# Patient Record
Sex: FEMALE | Race: AMERICAN INDIAN OR ALASKA NATIVE
[De-identification: names, ages, dates, MRNs, and addresses within clinical notes are randomized per-mention and may not be internally consistent; named-entity substitution may affect disease eponyms.]

---
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
## 2020-12-17 ENCOUNTER — HOSPITAL ENCOUNTER (INPATIENT)
Dept: HOSPITAL 46 - FBC | Age: 20
LOS: 2 days | Discharge: HOME | End: 2020-12-19
Attending: OBSTETRICS & GYNECOLOGY | Admitting: OBSTETRICS & GYNECOLOGY
Payer: COMMERCIAL

## 2020-12-17 VITALS — WEIGHT: 203.05 LBS | BODY MASS INDEX: 36.43 KG/M2 | HEIGHT: 62.52 IN

## 2020-12-17 DIAGNOSIS — Z79.899: ICD-10-CM

## 2020-12-17 DIAGNOSIS — D68.9: ICD-10-CM

## 2020-12-17 DIAGNOSIS — Z3A.39: ICD-10-CM

## 2020-12-17 DIAGNOSIS — Z88.1: ICD-10-CM

## 2020-12-17 DIAGNOSIS — O26.03: ICD-10-CM

## 2020-12-17 DIAGNOSIS — D64.9: ICD-10-CM

## 2020-12-17 DIAGNOSIS — J45.909: ICD-10-CM

## 2020-12-17 PROCEDURE — A9270 NON-COVERED ITEM OR SERVICE: HCPCS

## 2020-12-17 PROCEDURE — 10H07YZ INSERTION OF OTHER DEVICE INTO PRODUCTS OF CONCEPTION, VIA NATURAL OR ARTIFICIAL OPENING: ICD-10-PCS | Performed by: OBSTETRICS & GYNECOLOGY

## 2020-12-17 PROCEDURE — 00HU33Z INSERTION OF INFUSION DEVICE INTO SPINAL CANAL, PERCUTANEOUS APPROACH: ICD-10-PCS

## 2020-12-17 PROCEDURE — 3E0R3BZ INTRODUCTION OF ANESTHETIC AGENT INTO SPINAL CANAL, PERCUTANEOUS APPROACH: ICD-10-PCS

## 2020-12-17 PROCEDURE — 10907ZC DRAINAGE OF AMNIOTIC FLUID, THERAPEUTIC FROM PRODUCTS OF CONCEPTION, VIA NATURAL OR ARTIFICIAL OPENING: ICD-10-PCS | Performed by: OBSTETRICS & GYNECOLOGY

## 2020-12-17 NOTE — PR
[facility]
                                    [address]
                                  [place], [place]  [address]
_________________________________________________________________________________________
                                                                 Signed   
 
 
===================================
Progress Notes IP
===================================
Datetime Report Generated by CPN: 2020 23:42
   
PROGRESS NOTES:  [id number]
Impression:  Normal Progression of Labor; Reassuring Fetal Heart Rate
Procedures:  Sterile Vag Exam
Plan:  Continue Present Management; Anticipate Vaginal Delivery
Informed Consent Obtain:  Vaginal Delivery
VITAL SIGNS:  [id number]
Vital Signs:  Reviewed; Within Normal Limits
EXAM:  [id number]
Dilatation:  10.0
Effacement:  100
Station:  2
Contractions:  Rare
MEMBRANES:  [id number]
Comments:  Pt seen and examined. Doing well. Feeling some increased pelvic pressure. On
   exam, now 10cm +2 station. Will start pushing and anticipate  soon.
FETUS A:  [id number]
FHR Baseline:  140
Variability:  Moderate 6-25bpm
Accelerations:  15X15
Decelerations:  None
FHR Category:  Category I
Presentation:  Vertex
Comments on Fetus A:  No evidence of fetal metabolic acidosis
FETUS B:  [id number]
Signing Physician:  Marlys St DO
 
 
Copies:                                
~
 
 
 
 
 
 
 
 
 
*Electronically Signed*  20  [address]  [address] DO               
                                                                       
_________________________________________________________________________________________
PATIENT NAME:     LANDEN MCCANN               
MEDICAL RECORD #: [id number]                     PROGRESS NOTE                 
          ACCT #: [id number]  
DATE OF BIRTH:   [birth year]                                       
PHYSICIAN:   MARLYS ST DO                      RPT #: [id number]
REPORT IS CONFIDENTIAL AND NOT TO BE RELEASED WITHOUT AUTHORIZATION

## 2020-12-17 NOTE — PR
[facility]
                                    [address]
                                  [place], [place]  [address]
_________________________________________________________________________________________
                                                                 Signed   
 
 
===================================
Progress Notes IP
===================================
Datetime Report Generated by CPN: 12/17/2020 14:43
   
PROGRESS NOTES:  [id number]
Impression:  Normal Progression of Labor; Reassuring Fetal Heart Rate
Procedures:  Intrauterine Pressure Catheter; Sterile Vag Exam
Plan:  Continue Present Management
Informed Consent Obtain:  Vaginal Delivery
VITAL SIGNS:  [id number]
Vital Signs:  Reviewed; Within Normal Limits
EXAM:  [id number]
Dilatation:  3.0
Effacement:  80
Station:  -2
Contractions:  Rare
MEMBRANES:  [id number]
Comments:  Pt seen and examined. Doing well. Contractions more uncomfortable. Pitocin
   started per protocol. Not tracing CTXs well, so discussed IUPC and IUPC placed without
   difficulty.
FETUS A:  [id number]
FHR Baseline:  140
Variability:  Moderate 6-25bpm
Accelerations:  15X15
Decelerations:  None
FHR Category:  Category I
Presentation:  Vertex
Comments on Fetus A:  No evidence of fetal metabolic acidosis
FETUS B:  [id number]
Signing Physician:  Marlys St DO
 
 
Copies:                                
~
 
 
 
 
 
 
 
 
*Electronically Signed*  12/17/20  [address]  [address] DO               
                                                                       
_________________________________________________________________________________________
PATIENT NAME:     LANDEN MCCANN               
MEDICAL RECORD #: [id number]                     PROGRESS NOTE                 
          ACCT #: [id number]  
DATE OF BIRTH:   [birth year]                                       
PHYSICIAN:   MARLYS ST DO                      RPT #: [id number]
REPORT IS CONFIDENTIAL AND NOT TO BE RELEASED WITHOUT AUTHORIZATION

## 2020-12-17 NOTE — PR
[facility]
                                    [address]
                                  [place], [place]  [address]
_________________________________________________________________________________________
                                                                 Signed   
 
 
===================================
Progress Notes IP
===================================
Datetime Report Generated by CPN: 12/17/2020 17:14
   
PROGRESS NOTES:  [id number]
Impression:  Normal Progression of Labor; Reassuring Fetal Heart Rate
Procedures:  Intrauterine Pressure Catheter; Sterile Vag Exam
Plan:  Continue Present Management
Informed Consent Obtain:  Vaginal Delivery
VITAL SIGNS:  [id number]
Vital Signs:  Reviewed; Within Normal Limits
EXAM:  [id number]
Dilatation:  4.0
Effacement:  80
Station:  -2
Contractions:  Rare
MEMBRANES:  [id number]
Comments:  Pt seen and examined. Doing well. Comfortable w/ epidural. CTXs adequate.
   Reviewed anticipated course of labor and delivery. All questions answered. Reviewed
   EFW 7#4oz.
FETUS A:  [id number]
FHR Baseline:  140
Variability:  Moderate 6-25bpm
Accelerations:  15X15
Decelerations:  None
FHR Category:  Category I
Presentation:  Vertex
Comments on Fetus A:  No evidence of fetal metabolic acidosis
FETUS B:  [id number]
Signing Physician:  Marlys St DO
 
 
Copies:                                
~
 
 
 
 
 
 
 
 
*Electronically Signed*  12/17/20  [address]  [address] DO               
                                                                       
_________________________________________________________________________________________
PATIENT NAME:     LANDEN MCCANN               
MEDICAL RECORD #: [id number]                     PROGRESS NOTE                 
          ACCT #: [id number]  
DATE OF BIRTH:   [birth year]                                       
PHYSICIAN:   MARLYS ST DO                      RPT #: [id number]
REPORT IS CONFIDENTIAL AND NOT TO BE RELEASED WITHOUT AUTHORIZATION

## 2020-12-18 PROCEDURE — 0KQM0ZZ REPAIR PERINEUM MUSCLE, OPEN APPROACH: ICD-10-PCS | Performed by: OBSTETRICS & GYNECOLOGY

## 2020-12-19 NOTE — PR
[facility]
                                    [address]
                                  [place], [place]  [address]
_________________________________________________________________________________________
                                                                 Signed   
 
 
===================================
PP Progress Notes
===================================
Datetime Report Generated by CPN: 2020 08:17
   
SUBJECTIVE:  [id number]
Pain:  Within Normal Limits
Nausea/Vomiting:  Denies
Flatus:  Yes
Bowel Movement:  Yes
Vital Signs:  X7185247
Vital Signs:  Reviewed; Within Normal Limits
POSTPARTUM EXAM:  Ongoing
Cardiovascular:  Normal
Respiratory:  Normal
Abdomen/Uterus:  Normal
Lochia:  Normal
Vulva/Perineum:  Not Done
Breasts:  Not Done
CVA Tenderness:  Normal
Extremities:  Normal
 Incision:  Not Applicable
Breastfeeding Progress:  Normal
Exam Comments:  
   Fundus firm U-2 nontender
IMPRESSION/PLAN/PROCEDURES:  [id number]
Impression:  Normal Postpartum Progression
Plan:  Discharge
Progress Notes:  Pt seen and examined. Doing well. Ambulating, voiding, and tolerating
   full diet. Pain and lochia minimal. Breast and bottlefeeding. No questions or
   concerns. Desires d/c home today. Undecided on contraceptive. Reviewed d/c
   instructions in detail. F/U 2 wks.
Signing Physician:  Marlys St DO
 
 
Copies:                                
~
 
 
 
 
 
 
*Electronically Signed*  20  [address]  [address] DO               
                                                                       
_________________________________________________________________________________________
PATIENT NAME:     LANDEN MCCANN               
MEDICAL RECORD #: [id number]                     PROGRESS NOTE                 
          ACCT #: [id number]  
DATE OF BIRTH:   [birth year]                                       
PHYSICIAN:   MARLYS ST DO                      RPT #: [id number]
REPORT IS CONFIDENTIAL AND NOT TO BE RELEASED WITHOUT AUTHORIZATION

## 2021-12-12 ENCOUNTER — HOSPITAL ENCOUNTER (EMERGENCY)
Dept: HOSPITAL 46 - ED | Age: 21
Discharge: ANOTHER HOSPITAL | End: 2021-12-12
Payer: COMMERCIAL

## 2021-12-12 VITALS — HEIGHT: 66 IN | WEIGHT: 183.65 LBS | BODY MASS INDEX: 29.51 KG/M2

## 2021-12-12 DIAGNOSIS — S06.6X9A: Primary | ICD-10-CM

## 2021-12-12 DIAGNOSIS — S01.311A: ICD-10-CM

## 2021-12-12 DIAGNOSIS — Z20.822: ICD-10-CM
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
## 2021-12-12 PROCEDURE — C9803 HOPD COVID-19 SPEC COLLECT: HCPCS

## 2021-12-12 PROCEDURE — G0480 DRUG TEST DEF 1-7 CLASSES: HCPCS

## 2021-12-12 PROCEDURE — U0003 INFECTIOUS AGENT DETECTION BY NUCLEIC ACID (DNA OR RNA); SEVERE ACUTE RESPIRATORY SYNDROME CORONAVIRUS 2 (SARS-COV-2) (CORONAVIRUS DISEASE [COVID-19]), AMPLIFIED PROBE TECHNIQUE, MAKING USE OF HIGH THROUGHPUT TECHNOLOGIES AS DESCRIBED BY CMS-2020-01-R: HCPCS
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
